# Patient Record
Sex: MALE | Race: WHITE | Employment: FULL TIME | URBAN - NONMETROPOLITAN AREA
[De-identification: names, ages, dates, MRNs, and addresses within clinical notes are randomized per-mention and may not be internally consistent; named-entity substitution may affect disease eponyms.]

---

## 2020-11-23 ENCOUNTER — HOSPITAL ENCOUNTER (OUTPATIENT)
Age: 25
Setting detail: SPECIMEN
Discharge: HOME | End: 2020-11-23
Payer: COMMERCIAL

## 2020-11-23 PROCEDURE — U0003 INFECTIOUS AGENT DETECTION BY NUCLEIC ACID (DNA OR RNA); SEVERE ACUTE RESPIRATORY SYNDROME CORONAVIRUS 2 (SARS-COV-2) (CORONAVIRUS DISEASE [COVID-19]), AMPLIFIED PROBE TECHNIQUE, MAKING USE OF HIGH THROUGHPUT TECHNOLOGIES AS DESCRIBED BY CMS-2020-01-R: HCPCS

## 2020-11-25 LAB — SARS-COV-2: DETECTED

## 2024-04-15 ENCOUNTER — HOSPITAL ENCOUNTER (EMERGENCY)
Age: 29
Discharge: HOME | End: 2024-04-15
Attending: EMERGENCY MEDICINE
Payer: COMMERCIAL

## 2024-04-15 VITALS
RESPIRATION RATE: 16 BRPM | DIASTOLIC BLOOD PRESSURE: 93 MMHG | OXYGEN SATURATION: 97 % | HEART RATE: 65 BPM | WEIGHT: 170 LBS | TEMPERATURE: 98.3 F | HEIGHT: 71 IN | SYSTOLIC BLOOD PRESSURE: 146 MMHG | BODY MASS INDEX: 23.8 KG/M2

## 2024-04-15 DIAGNOSIS — L03.011 ABSCESS AROUND NAIL OF RIGHT MIDDLE FINGER: Primary | ICD-10-CM

## 2024-04-15 PROCEDURE — 6370000000 HC RX 637 (ALT 250 FOR IP): Performed by: EMERGENCY MEDICINE

## 2024-04-15 PROCEDURE — 99283 EMERGENCY DEPT VISIT LOW MDM: CPT

## 2024-04-15 PROCEDURE — 20610 DRAIN/INJ JOINT/BURSA W/O US: CPT

## 2024-04-15 PROCEDURE — 26010 DRAINAGE OF FINGER ABSCESS: CPT

## 2024-04-15 RX ORDER — DOXYCYCLINE HYCLATE 100 MG
100 TABLET ORAL ONCE
Status: COMPLETED | OUTPATIENT
Start: 2024-04-15 | End: 2024-04-15

## 2024-04-15 RX ORDER — DOXYCYCLINE HYCLATE 100 MG
100 TABLET ORAL 2 TIMES DAILY
Qty: 14 TABLET | Refills: 0 | Status: SHIPPED | OUTPATIENT
Start: 2024-04-15 | End: 2024-04-22

## 2024-04-15 RX ADMIN — DOXYCYCLINE HYCLATE 100 MG: 100 TABLET, COATED ORAL at 19:26

## 2024-04-15 ASSESSMENT — PAIN DESCRIPTION - DESCRIPTORS: DESCRIPTORS: ACHING

## 2024-04-15 ASSESSMENT — PAIN DESCRIPTION - PAIN TYPE: TYPE: ACUTE PAIN

## 2024-04-15 ASSESSMENT — PAIN - FUNCTIONAL ASSESSMENT: PAIN_FUNCTIONAL_ASSESSMENT: 0-10

## 2024-04-15 ASSESSMENT — PAIN DESCRIPTION - LOCATION: LOCATION: FINGER (COMMENT WHICH ONE)

## 2024-04-15 ASSESSMENT — PAIN DESCRIPTION - ORIENTATION: ORIENTATION: RIGHT

## 2024-04-15 ASSESSMENT — PAIN SCALES - GENERAL: PAINLEVEL_OUTOF10: 2

## 2024-04-15 NOTE — DISCHARGE INSTRUCTIONS
Keep area clean and dry.  Use warm soaks to the area to decrease infection.  Take doxycycline twice a day.  Do not lay flat after taking doxycycline for at least 1 hour.  Next dose of doxycycline tomorrow morning.

## 2024-04-15 NOTE — ED PROVIDER NOTES
[facility]  [address] [place] [id number]  Phone: [phone]  EMERGENCY DEPARTMENT ENCOUNTER      Pt Name: Mainor Ramirez  MRN: [id number]  Birthdate 1995  Date of evaluation: 4/15/2024  Provider: Ashkan Allen MD    CHIEF COMPLAINT       Chief Complaint   Patient presents with    Finger Pain         HISTORY OF PRESENT ILLNESS      Mainor Ramirez is a 28 y.o. male who presents to the emergency department with above-noted complaint.  Cut his finger and seem like it healed up okay but now he has some yellow discoloration underneath it more pain.  Right at his right middle finger underneath the nail on the ulnar aspect        REVIEW OF SYSTEMS     Positive for pain and swelling discoloration  Review of Systems  All systems negative except as marked.     PAST MEDICAL HISTORY     History reviewed. No pertinent past medical history.      SURGICAL HISTORY       History reviewed. No pertinent surgical history.      CURRENT MEDICATIONS       Previous Medications    No medications on file       ALLERGIES       Patient has no known allergies.    FAMILY HISTORY       History reviewed. No pertinent family history.       SOCIAL HISTORY       Social History     Tobacco Use    Smoking status: Never   Substance Use Topics    Alcohol use: Yes     Alcohol/week: 2.0 standard drinks of alcohol     Types: 2 Cans of beer per week     Comment: social    Drug use: No         PHYSICAL EXAM           Physical Exam    VITAL SIGNS: BP (!) 146/93   Pulse 65   Temp 98.3 °F (36.8 °C) (Temporal)   Resp 16   Ht 1.803 m (5' 11\")   Wt 77.1 kg (170 lb)   SpO2 97%   BMI 23.71 kg/m²    Constitutional:  Alert not toxtic or ill,   HENT:  Normocephalic, Atraumatic  Cervical Spine: Normal range of motion,  No stridor.   Eyes:  No discharge or  Swelling  Respiratory: No respiratory distress  Musculoskeletal:  Intact distal pulses, capillary refill is brisk.  Approximately the third of the nail ulnar aspect

## 2024-04-15 NOTE — ED TRIAGE NOTES
Patient states he had a cut on his right middle finger that is now painful with a yellow center to it.

## 2025-04-18 ENCOUNTER — HOSPITAL ENCOUNTER (EMERGENCY)
Age: 30
Discharge: ANOTHER HOSPITAL | End: 2025-04-19
Attending: EMERGENCY MEDICINE
Payer: COMMERCIAL

## 2025-04-18 DIAGNOSIS — T78.40XA ALLERGIC REACTION, INITIAL ENCOUNTER: Primary | ICD-10-CM

## 2025-04-18 LAB
ALBUMIN SERPL BCP-MCNC: 4.1 GM/DL (ref 3.4–5)
ALP SERPL-CCNC: 61 U/L (ref 46–116)
ALT SERPL W P-5'-P-CCNC: 65 U/L (ref 14–63)
ANION GAP SERPL CALC-SCNC: 12 MEQ/L (ref 8–16)
AST SERPL W P-5'-P-CCNC: 28 U/L (ref 15–37)
BASOPHILS # BLD: 0.6 % (ref 0–3)
BASOPHILS ABSOLUTE: 0.1 THOU/MM3 (ref 0–0.1)
BILIRUB SERPL-MCNC: 0.4 MG/DL (ref 0.2–1)
BUN SERPL-MCNC: 15 MG/DL (ref 7–18)
CALCIUM SERPL-MCNC: 9.4 MG/DL (ref 8.5–10.1)
CHLORIDE SERPL-SCNC: 105 MEQ/L (ref 98–107)
CO2 SERPL-SCNC: 27 MEQ/L (ref 21–32)
CREAT SERPL-MCNC: 1.3 MG/DL (ref 0.6–1.3)
EOSINOPHILS ABSOLUTE: 0.4 THOU/MM3 (ref 0–0.5)
EOSINOPHILS RELATIVE PERCENT: 3.5 % (ref 0–4)
GFR SERPL CREATININE-BSD FRML MDRD: 76 ML/MIN/1.73M2
GLUCOSE SERPL-MCNC: 100 MG/DL (ref 74–106)
HCT VFR BLD CALC: 46.2 % (ref 42–52)
HEMOGLOBIN: 15.4 GM/DL (ref 14–18)
IMMATURE GRANS (ABS): 0 THOU/MM3 (ref 0–0.07)
IMMATURE GRANULOCYTES %: 0 %
LIPASE SERPL-CCNC: 39 U/L (ref 16–77)
LYMPHOCYTES # BLD AUTO: 34.1 % (ref 15–47)
LYMPHOCYTES ABSOLUTE: 3.4 THOU/MM3 (ref 1–4.8)
MCH RBC QN AUTO: 29 PG (ref 26–32)
MCHC RBC AUTO-ENTMCNC: 33.3 GM/DL (ref 31–35)
MCV RBC AUTO: 87 FL (ref 80–94)
MONOCYTES: 0.7 THOU/MM3 (ref 0.3–1.3)
MONOCYTES: 7 % (ref 0–12)
NEUTROPHILS ABSOLUTE: 5.5 THOU/MM3 (ref 1.8–7.7)
PDW BLD-RTO: 12.6 % (ref 11.5–14.9)
PLATELET # BLD AUTO: 269 THOU/MM3 (ref 130–400)
PMV BLD AUTO: 9.8 FL (ref 9.4–12.4)
POTASSIUM SERPL-SCNC: 3 MEQ/L (ref 3.5–5.1)
PROT SERPL-MCNC: 7.7 GM/DL (ref 6.4–8.2)
RBC # BLD: 5.31 MILL/MM3 (ref 4.5–6.1)
SEG NEUTROPHILS: 54.6 % (ref 43–75)
SODIUM SERPL-SCNC: 144 MEQ/L (ref 136–145)
WBC # BLD: 10 THOU/MM3 (ref 4.8–10.8)

## 2025-04-18 PROCEDURE — 2500000003 HC RX 250 WO HCPCS: Performed by: EMERGENCY MEDICINE

## 2025-04-18 PROCEDURE — 6360000002 HC RX W HCPCS: Performed by: EMERGENCY MEDICINE

## 2025-04-18 PROCEDURE — 85025 COMPLETE CBC W/AUTO DIFF WBC: CPT

## 2025-04-18 PROCEDURE — 83690 ASSAY OF LIPASE: CPT

## 2025-04-18 PROCEDURE — 6370000000 HC RX 637 (ALT 250 FOR IP): Performed by: EMERGENCY MEDICINE

## 2025-04-18 PROCEDURE — 80053 COMPREHEN METABOLIC PANEL: CPT

## 2025-04-18 PROCEDURE — 99285 EMERGENCY DEPT VISIT HI MDM: CPT

## 2025-04-18 PROCEDURE — 96375 TX/PRO/DX INJ NEW DRUG ADDON: CPT

## 2025-04-18 PROCEDURE — 96374 THER/PROPH/DIAG INJ IV PUSH: CPT

## 2025-04-18 PROCEDURE — 2580000003 HC RX 258: Performed by: EMERGENCY MEDICINE

## 2025-04-18 RX ORDER — ONDANSETRON 2 MG/ML
4 INJECTION INTRAMUSCULAR; INTRAVENOUS ONCE
Status: COMPLETED | OUTPATIENT
Start: 2025-04-18 | End: 2025-04-18

## 2025-04-18 RX ORDER — DIPHENHYDRAMINE HYDROCHLORIDE 50 MG/ML
25 INJECTION, SOLUTION INTRAMUSCULAR; INTRAVENOUS ONCE
Status: COMPLETED | OUTPATIENT
Start: 2025-04-18 | End: 2025-04-18

## 2025-04-18 RX ORDER — POTASSIUM CHLORIDE 750 MG/1
40 TABLET, EXTENDED RELEASE ORAL ONCE
Status: COMPLETED | OUTPATIENT
Start: 2025-04-18 | End: 2025-04-18

## 2025-04-18 RX ADMIN — POTASSIUM CHLORIDE 40 MEQ: 750 TABLET, EXTENDED RELEASE ORAL at 21:04

## 2025-04-18 RX ADMIN — WATER 125 MG: 1 INJECTION INTRAMUSCULAR; INTRAVENOUS; SUBCUTANEOUS at 21:00

## 2025-04-18 RX ADMIN — DIPHENHYDRAMINE HYDROCHLORIDE 25 MG: 50 INJECTION, SOLUTION INTRAMUSCULAR; INTRAVENOUS at 22:05

## 2025-04-18 RX ADMIN — ONDANSETRON 4 MG: 2 INJECTION, SOLUTION INTRAMUSCULAR; INTRAVENOUS at 20:27

## 2025-04-18 RX ADMIN — FAMOTIDINE 20 MG: 10 INJECTION, SOLUTION INTRAVENOUS at 22:21

## 2025-04-18 ASSESSMENT — PAIN SCALES - GENERAL: PAINLEVEL_OUTOF10: 3

## 2025-04-18 ASSESSMENT — PAIN - FUNCTIONAL ASSESSMENT: PAIN_FUNCTIONAL_ASSESSMENT: 0-10

## 2025-04-19 VITALS
SYSTOLIC BLOOD PRESSURE: 129 MMHG | HEART RATE: 82 BPM | OXYGEN SATURATION: 98 % | TEMPERATURE: 97.8 F | DIASTOLIC BLOOD PRESSURE: 86 MMHG | RESPIRATION RATE: 19 BRPM | HEIGHT: 71 IN | BODY MASS INDEX: 24.5 KG/M2 | WEIGHT: 175 LBS

## 2025-04-19 NOTE — ED PROVIDER NOTES
[facility] Emergency Department  [address]  [facility] [address]  Phone: [phone]  EMERGENCY DEPARTMENT ENCOUNTER      Pt Name: Mainor Ramirez  MRN: [id number]  Birthdate 1995  Date of evaluation: 4/18/2025  Provider: Ashkan Allen MD    CHIEF COMPLAINT       Chief Complaint   Patient presents with    Allergic Reaction     Ate fish around 1800 and developed nausea, rash, and sob, medics brought pt in, gave benadryl and epi en route         HISTORY OF PRESENT ILLNESS      Mainor Ramirez is a 29 y.o. male who presents to the emergency department with above noted complaint.  Patient has developed some nausea vomiting rash not feeling good after eating fish.  Family states he has had episodes just nausea not feeling good during lent.  He subsequently had fish again today and has developed rash nausea vomiting and she thought he vomited some blood.  He was retching        REVIEW OF SYSTEMS     Positive nausea vomiting rash  Review of Systems  All systems negative except as marked.     PAST MEDICAL HISTORY     History reviewed. No pertinent past medical history.      SURGICAL HISTORY       History reviewed. No pertinent surgical history.      CURRENT MEDICATIONS       There are no discharge medications for this patient.      ALLERGIES       Fish-derived products    FAMILY HISTORY       History reviewed. No pertinent family history.       SOCIAL HISTORY       Social History     Tobacco Use    Smoking status: Never   Substance Use Topics    Alcohol use: Yes     Alcohol/week: 2.0 standard drinks of alcohol     Types: 2 Cans of beer per week     Comment: social    Drug use: No         PHYSICAL EXAM         Physical Exam    VITAL SIGNS: /86   Pulse 82   Temp 97.8 °F (36.6 °C) (Oral)   Resp 19   Ht 1.803 m (5' 11\")   Wt 79.4 kg (175 lb)   SpO2 98%   BMI 24.41 kg/m²    Constitutional:  Alert not toxic or ill,   HENT:  normocephalic, Atraumatic,  Bilateral external ears normal,

## 2025-04-19 NOTE — ED NOTES
Call out at this time for need of intermediate transport to [facility], pt and spouse made aware of room number with no concerns voiced.

## 2025-04-19 NOTE — ED NOTES
Pt presents via [facility] for allergic reaction after consuming fish around 1800, pt states he has had a minimal reaction to eating fish in the past, but never this bad, pt states he developed SOB, difficulty swallowing, nausea and emesis, as well as a rash throughout his body, medics gave 0.3mg IM epi, and 50mg IV Benadryl, pt assisted to ED cot with extensive assist, pt c/o feeling jittery upon arrival, wife at the bedside. Rash noted to face, neck and chest, redness and swelling noted to the back of throat.